# Patient Record
Sex: FEMALE | Race: WHITE
[De-identification: names, ages, dates, MRNs, and addresses within clinical notes are randomized per-mention and may not be internally consistent; named-entity substitution may affect disease eponyms.]

---

## 2017-03-06 ENCOUNTER — HOSPITAL ENCOUNTER (EMERGENCY)
Dept: HOSPITAL 74 - JERFT | Age: 37
Discharge: HOME | End: 2017-03-06
Payer: COMMERCIAL

## 2017-03-06 VITALS — BODY MASS INDEX: 29.9 KG/M2

## 2017-03-06 VITALS — HEART RATE: 88 BPM | SYSTOLIC BLOOD PRESSURE: 122 MMHG | DIASTOLIC BLOOD PRESSURE: 68 MMHG | TEMPERATURE: 98.6 F

## 2017-03-06 DIAGNOSIS — Z3A.26: ICD-10-CM

## 2017-03-06 DIAGNOSIS — J02.9: Primary | ICD-10-CM

## 2017-03-06 NOTE — PDOC
History of Present Illness





- General


Chief Complaint: Sore Throat


Stated Complaint: 26WKS PREGNANT, STREP THROAT


Time Seen by Provider: 03/06/17 11:45


History Source: Patient


Exam Limitations: No Limitations





- History of Present Illness


Initial Comments: 





03/06/17 12:19


36 yr female with sore throat this am. Pt states her children had strep last 

week. Pt is 7 months pregnant no medical history or allergies. 





Past History





- Past Medical History


Allergies/Adverse Reactions: 


 Allergies











Allergy/AdvReac Type Severity Reaction Status Date / Time


 


No Known Allergies Allergy   Verified 03/06/17 10:36











Home Medications: 


Ambulatory Orders





NK [No Known Home Medication]  03/06/17 








Asthma: No


Cancer: No


Cardiac Disorders: No


Diabetes: No


HTN: No


Seizures: No


Thyroid Disease: No





- Family Disease History


Comment:: 





03/06/17 12:20


none





- Psycho/Social/Smoking Cessation Hx


Anxiety: No


Suicidal Ideation: No


Smoking Status: No


Smoking History: Never smoked


Have you smoked in the past 12 months: No


Number of Cigarettes Smoked Daily: 0


Information on smoking cessation initiated: No


Hx Alcohol Use: No


Drug/Substance Use Hx: No


Substance Use Type: None


Hx Substance Use Treatment: No





**Review of Systems





- Review of Systems


Able to Perform ROS?: Yes


Is the patient limited English proficient: No


Constitutional: No: Symptoms Reported


HEENTM: Yes: See HPI


Respiratory: No: Symptoms reported


Cardiac (ROS): No: Symptoms Reported


ABD/GI: No: Symptoms Reported


: No: Symptoms Reported


Musculoskeletal: No: Symptoms Reported


Integumentary: No: Symptoms Reported


Neurological: No: Symptoms reported





*Physical Exam





- Vital Signs


 Last Vital Signs











Temp Pulse Resp BP Pulse Ox


 


 98.6 F   88   18   122/68   100 


 


 03/06/17 10:37  03/06/17 10:37  03/06/17 10:37  03/06/17 10:37  03/06/17 10:37














- Physical Exam


General Appearance: Yes: Nourished, Appropriately Dressed


HEENT: positive: EOMI, AUDREY, TMs Normal, Pharynx Normal


Neck: positive: Supple


Respiratory/Chest: positive: Lungs Clear, Normal Breath Sounds


Cardiovascular: positive: Regular Rhythm, Regular Rate


Gastrointestinal/Abdominal: positive: Normal Bowel Sounds, Soft


Musculoskeletal: positive: Normal Inspection


Extremity: positive: Normal Capillary Refill, Normal Inspection, Normal Range 

of Motion


Integumentary: positive: Normal Color, Dry, Warm


Neurologic: positive: Fully Oriented, Alert, Normal Mood/Affect, Normal Response

, Motor Strength 5/5





Medical Decision Making





- Medical Decision Making





03/06/17 12:20


cc: sore throat 


afebrile no diff swallowing 


will check for strep 








*DC/Admit/Observation/Transfer


Diagnosis at time of Disposition: 


Pharyngitis


Qualifiers:


 Pharyngitis/tonsillitis etiology: unspecified etiology Qualified Code(s): 

J02.9 - Acute pharyngitis, unspecified





- Discharge Dispostion


Disposition: HOME


Condition at time of disposition: Good





- Patient Instructions


Additional Instructions: 


negative for strep throat on the rapid swab


gargle with warm salt water 4-5 times a day


throat lozengers lemon drops 


drink pleanty of fluids


tylenol for pain as needed 





follow with ENT if any symptoms worsen or persist

## 2017-05-30 ENCOUNTER — HOSPITAL ENCOUNTER (INPATIENT)
Dept: HOSPITAL 74 - JDEL | Age: 37
LOS: 2 days | Discharge: HOME | DRG: 560 | End: 2017-06-01
Attending: OBSTETRICS & GYNECOLOGY | Admitting: OBSTETRICS & GYNECOLOGY
Payer: COMMERCIAL

## 2017-05-30 VITALS — BODY MASS INDEX: 31.5 KG/M2

## 2017-05-30 DIAGNOSIS — Z3A.38: ICD-10-CM

## 2017-05-30 DIAGNOSIS — E66.8: ICD-10-CM

## 2017-05-30 LAB
ANION GAP SERPL CALC-SCNC: 13 MMOL/L (ref 8–16)
APTT BLD: 26.6 SECONDS (ref 26.9–34.4)
ARTERIAL PATENCY WRIST A: (no result)
BASE EXCESS BLDA CALC-SCNC: -3.8 MEQ/L (ref -2–2)
BASOPHILS # BLD: 0.7 % (ref 0–2)
CALCIUM SERPL-MCNC: 9.4 MG/DL (ref 8.5–10.1)
CO2 SERPL-SCNC: 19 MMOL/L (ref 21–32)
COCKROFT - GAULT: 121.72
CREAT SERPL-MCNC: 0.7 MG/DL (ref 0.55–1.02)
DEPRECATED RDW RBC AUTO: 14.6 % (ref 11.6–15.6)
DEPRECATED RDW RBC AUTO: 14.8 % (ref 11.6–15.6)
EOSINOPHIL # BLD: 0.5 % (ref 0–4.5)
GLUCOSE SERPL-MCNC: 82 MG/DL (ref 74–106)
HCO3 BLDA-SCNC: 19.9 MEQ/L (ref 22–26)
HIV 1 & 2 AB: NEGATIVE
HIV 1 AGP24: NEGATIVE
INR BLD: 0.96 (ref 0.82–1.09)
LPM/O2%: (no result)
MCH RBC QN AUTO: 27.7 PG (ref 25.7–33.7)
MCH RBC QN AUTO: 28 PG (ref 25.7–33.7)
MCHC RBC AUTO-ENTMCNC: 32.9 G/DL (ref 32–36)
MCHC RBC AUTO-ENTMCNC: 33.3 G/DL (ref 32–36)
MCV RBC: 84.1 FL (ref 80–96)
MCV RBC: 84.2 FL (ref 80–96)
NEUTROPHILS # BLD: 55 % (ref 42.8–82.8)
NEUTROPHILS # BLD: 67 % (ref 42.8–82.8)
PLATELET # BLD AUTO: 113 K/MM3 (ref 134–434)
PLATELET # BLD EST: (no result) 10*3/UL
PLATELET # BLD EST: ADEQUATE 10*3/UL
PLATELET COMMENT2: (no result)
PLATELET COMMENT2: (no result)
PLATELET COMMENT3: (no result)
PLATELET COMMENT3: (no result)
PMV BLD: 11.3 FL (ref 7.5–11.1)
PO2 BLDA: 42.6 MMHG (ref 80–100)
PT PNL PPP: 10.6 SEC (ref 9.98–11.88)
PT. ON O2?: YES
SAO2 % BLDA: 82.6 % (ref 90–98.9)
TYPE OF O2: (no result)
WBC # BLD AUTO: 8.4 K/MM3 (ref 4–10)
WBC # BLD AUTO: 8.9 K/MM3 (ref 4–10)

## 2017-05-30 RX ADMIN — AMPICILLIN SODIUM SCH GM: 1 INJECTION, POWDER, FOR SOLUTION INTRAMUSCULAR; INTRAVENOUS at 10:00

## 2017-05-30 RX ADMIN — IBUPROFEN PRN MG: 600 TABLET, FILM COATED ORAL at 20:50

## 2017-05-30 RX ADMIN — AMPICILLIN SODIUM SCH GM: 1 INJECTION, POWDER, FOR SOLUTION INTRAMUSCULAR; INTRAVENOUS at 06:20

## 2017-05-30 RX ADMIN — ACETAMINOPHEN PRN MG: 325 TABLET ORAL at 17:47

## 2017-05-30 RX ADMIN — ACETAMINOPHEN PRN MG: 325 TABLET ORAL at 20:49

## 2017-05-30 RX ADMIN — AMPICILLIN SODIUM SCH GM: 1 INJECTION, POWDER, FOR SOLUTION INTRAMUSCULAR; INTRAVENOUS at 14:20

## 2017-05-30 NOTE — HP
Past Medical History





- Primary Care Physician


PCP:: Ashwin Cat





- Admission


Chief Complaint: 36 yo  @ 38.2wks with contructions, no VB, no LOF, +FM


History of Present Illness: 


1. GBS+ - for Ampicillin prophylaxis





History Source: Patient


Limitations to Obtaining History: No Limitations





- Past Medical History


...: 5


...Para: 4


...Term: 4


...: 0


...Spon : 0


...Induced : 0


...Multiple Gestation: 0


...LMP: 16


... Weeks Gestation by Dates: 38.2


...EDC by Dates: 17


...EDC by Sono: 17





- Past Surgical History


Past Surgical History: Yes: None


Hx Myomectomy: No


Hx Transabdominal Cerclage: No





- Smoking History


Smoking history: Never smoked


Have you smoked in the past 12 months: No


Aproximately how many cigarettes per day: 0





- Alcohol/Substance Use


Hx Alcohol Use: No


History of Substance Use: reports: None





- Social History


History of Recent Travel: No





Home Medications





- Allergies


Allergies/Adverse Reactions: 


 Allergies











Allergy/AdvReac Type Severity Reaction Status Date / Time


 


No Known Allergies Allergy   Verified 17 02:50














- Home Medications


Home Medications: 


Ambulatory Orders





[id number]/Iron Fumarate/FA/Dss [Prenatal 19 Tablet] 1 each PO DAILY 17 











Review of Systems





- Review of Systems


Constitutional: reports: No Symptoms


Eyes: reports: No Symptoms


HENT: reports: No Symptoms


Neck: reports: No Symptoms


Cardiovascular: reports: No Symptoms


Respiratory: reports: No Symptoms


Gastrointestinal: reports: No Symptoms


Genitourinary: reports: No Symptoms


Breasts: reports: No Symptoms Reported


Musculoskeletal: reports: No Symptoms


Integumentary: reports: No Symptoms


Neurological: reports: No Symptoms


Endocrine: reports: No Symptoms


Hematology/Lymphatic: reports: No Symptoms


Psychiatric: reports: No Symptoms





Physical Exam - Maternity


Vital Signs: 


 Vital Signs











Temperature  98.0 F   17 05:00


 


Pulse Rate  69   17 06:00


 


Respiratory Rate  18   17 06:00


 


Blood Pressure  117/86   17 06:00


 


O2 Sat by Pulse Oximetry (%)      











Constitutional: Yes: Well Nourished


Cardiovascular: Yes: WNL


Lungs: Clear to auscultation


Breast(s): Yes: WNL





- Abdominal Exam/OB


Fundal Height: 37


Number of Fetuses: Single


Fetal Presentation: Vertex


Contractions: Yes


Regularity: Regular


Intensity: Mild/Mod


Fetal Monitor Mode: External


Fetal Heart Rate Location: Midline


Category: I


Accelerations: Uniform


Decelerations: None





- Vaginal Exam/OB


Vaginal Bleediing: Bloody Show


Dilatation (cm): 5


Effacement (%): 50%


Amniotic Membrane Status: Intact


Fetal Presentation: Vertex/Position


Fetal Station: -3





- Physical Exam


Musculoskeletal: Yes: WNL


Extremities: Yes: WNL


Edema: No


Integumentary: Yes: WNL


Deep Tendon Reflex Grade: Normal +2


...Motor Strength: WNL


Psychiatric: Yes: WNL





- Labs


Lab Results: 


 CBC, BMP





 17 02:20 





 17 02:20 











Assessment/Plan


36 yo P4 @ 38.2 wks in labor 


Admit to L&D


Send Labs, IVF


Desires Epidural for pain control


MF status reasuring


Will AROM after epidural for augmentation

## 2017-05-30 NOTE — PN
Ante-Partal Exam





- Subjective


Subjective: 


Pt w/o complaints


Vital Signs: 


 Vital Signs











Temperature  98.1 F   05/30/17 10:00


 


Pulse Rate  56 L  05/30/17 10:30


 


Respiratory Rate  20   05/30/17 10:30


 


Blood Pressure  135/76   05/30/17 10:30


 


O2 Sat by Pulse Oximetry (%)  100   05/30/17 10:30











Bleeding: No


Headache: No


Visual changes: No


Right upper quadrant pain: No


Pain (scale 1-10): 0 (Epidural)





- Contractions


Contractions: Yes


Regularity: Regular


Intensity: Mild/Mod


Fetal Monitor Mode: External





- Exam during Labor


Fetal Heart Rate: 110


Variability: Moderate


Fetal Heart Rate Location: Midline


Category: I


Fetal Monitor Accelerations: Present


Fetal Monitor Decelerations: Variable (occasional)


Exam: Vaginal


Dilatation (cm): 6


Effacement (%): 70


Amniotic Membrane Status: Intact


Fetal Presentation: Vertex


Fetal Station: -4





- Intrapartum Hemorrhage Risk


Medium Risk Factors: None


High Risk Factors: None


Risk Score: 0


Risk Level: Low Risk





- Assessment/Plan


Assessment/Plan: 


30 yo P4 with spont active labor.


FHT with change in baseline but o/w Category I


No cervical dilation, c/w arrest of dilation


Fetal head is very high to AROM now. Contx's are not regular.


Plan to augment with pitocin.

## 2017-05-30 NOTE — PN
Delivery





- Delivery


Vaginal Delivery: No Problems, Spontaneous


Type of Anesthesia: Epidural


Episiotomy/Laceration: None


EBL (cc): 300





Delivery, Single Birth





- Stages of Labor


Date 1st Stage Initiatied: 17


Time 1st Stage Initiated: 02:00


Date 2nd Stage Initiated: 17


Time 2nd Stage Initiated: 15:30


Date of Delivery: 17


Time of Delivery: 15:47


Date Placenta Delivered: 17


Time Placenta Delivered: 16:00


Placenta: Yes: Spontaneous, Normal Configuration





- Condition of Infant


Pediatrician/Neonatologist Present: Yes


Name: Sarahi Gates


Infant Gender: Female


Birth Weight: 3.175 kg


Position: Left, OA


Total Hours ROM (Hrs/Mins): 2hr 7min





- Apgar


  ** 1 Minute


Apgar Total Score: 9





  ** 5 Minutes


Apgar Total Score: 9





-  Feeding Plan


Initial Plan: Elected not to breastfeed exclusively throughout hospitalization





Remarks





- Remarks


Remarks: 


Normal delivery. Mother and baby are well.

## 2017-05-31 LAB
BASOPHILS # BLD: 0.5 % (ref 0–2)
DEPRECATED RDW RBC AUTO: 15.3 % (ref 11.6–15.6)
EOSINOPHIL # BLD: 1.1 % (ref 0–4.5)
MCH RBC QN AUTO: 27.8 PG (ref 25.7–33.7)
MCHC RBC AUTO-ENTMCNC: 32.9 G/DL (ref 32–36)
MCV RBC: 84.5 FL (ref 80–96)
NEUTROPHILS # BLD: 72.6 % (ref 42.8–82.8)
PLATELET # BLD AUTO: 105 K/MM3 (ref 134–434)
PMV BLD: 11 FL (ref 7.5–11.1)
WBC # BLD AUTO: 11.8 K/MM3 (ref 4–10)

## 2017-05-31 RX ADMIN — IBUPROFEN PRN MG: 600 TABLET, FILM COATED ORAL at 10:03

## 2017-05-31 RX ADMIN — ACETAMINOPHEN PRN MG: 325 TABLET ORAL at 05:38

## 2017-05-31 RX ADMIN — Medication SCH TAB: at 10:05

## 2017-05-31 RX ADMIN — IBUPROFEN PRN MG: 600 TABLET, FILM COATED ORAL at 05:38

## 2017-05-31 RX ADMIN — ACETAMINOPHEN PRN MG: 325 TABLET ORAL at 21:30

## 2017-05-31 RX ADMIN — ACETAMINOPHEN PRN MG: 325 TABLET ORAL at 00:23

## 2017-05-31 RX ADMIN — IBUPROFEN PRN MG: 600 TABLET, FILM COATED ORAL at 21:29

## 2017-05-31 RX ADMIN — IBUPROFEN PRN MG: 600 TABLET, FILM COATED ORAL at 00:24

## 2017-05-31 RX ADMIN — ACETAMINOPHEN PRN MG: 325 TABLET ORAL at 10:02

## 2017-05-31 RX ADMIN — DOCUSATE SODIUM,SENNOSIDES PRN TABLET: 50; 8.6 TABLET, FILM COATED ORAL at 10:02

## 2017-06-01 VITALS — DIASTOLIC BLOOD PRESSURE: 76 MMHG | TEMPERATURE: 98.6 F | HEART RATE: 71 BPM | SYSTOLIC BLOOD PRESSURE: 125 MMHG

## 2017-06-01 RX ADMIN — Medication SCH TAB: at 10:21

## 2017-06-01 RX ADMIN — ACETAMINOPHEN PRN MG: 325 TABLET ORAL at 08:08

## 2017-06-01 RX ADMIN — DOCUSATE SODIUM,SENNOSIDES PRN TABLET: 50; 8.6 TABLET, FILM COATED ORAL at 08:09

## 2017-06-01 RX ADMIN — IBUPROFEN PRN MG: 600 TABLET, FILM COATED ORAL at 08:07

## 2017-06-01 NOTE — PN
Post Partum Progress Note





- Subjective


Subjective: 


Patient without acute complaints. 


Reports tolerating oral intake without nausea or vomiting. 


Ambulating without dizziness. 


Denies fevers or chills.


Pain well controlled with oral pain medication.


Bottlefeeding


Passing flatus.


Post Partum Day: 2


Type of Delivery: 


Vital Signs: 


 Vital Signs











Temperature  98.6 F   17 09:06


 


Pulse Rate  71   17 09:06


 


Respiratory Rate  17   17 09:06


 


Blood Pressure  125/76   17 09:06


 


O2 Sat by Pulse Oximetry (%)  100   17 15:30











Breast Exam: Yes: Engorged


Uterus: Yes: Fundus Firm, Fundus below umbilicus


Abdomen/GI: Yes: Abdomen soft, Passing flatus, Tolerating PO.  No: Tender


Lochia: Yes: Serosa


Lochia, amount: Small


Extremities: Yes: Calves non-tender


Activity: Ambulating





- Labs


Labs: 


 CBC











WBC  11.8 K/mm3 (4.0-10.0)  H D 17  08:00    


 


RBC  3.82 M/mm3 (3.60-5.2)   17  08:00    


 


Hgb  10.6 GM/dL (10.7-15.3)  L  17  08:00    


 


Hct  32.3 % (32.4-45.2)  L  17  08:00    


 


MCV  84.5 fl (80-96)   17  08:00    


 


MCHC  32.9 g/dl (32.0-36.0)   17  08:00    


 


RDW  15.3 % (11.6-15.6)   17  08:00    


 


Plt Count  105 K/MM3 (134-434)  L  17  08:00    


 


MPV  11.0 fl (7.5-11.1)   17  08:00    


 


Neutrophils %  72.6 % (42.8-82.8)   17  08:00    


 


Lymphocytes %  20.9 % (8-40)   17  08:00    


 


Monocytes %  4.9 % (3.8-10.2)   17  08:00    


 


Eosinophils %  1.1 % (0-4.5)  D 17  08:00    


 


Basophils %  0.5 % (0-2.0)   17  08:00    


 


Platelet Estimate  Decreased  (NORMAL)   17  12:25    


 


Platelet Comment  Mod large plts   17  12:25    


 


Platelet Comment  No clumping noted   17  12:25    














Assessment/Plan


38 yo PPD # 2 s/p , afebrile, vital signs stable, doing well 


1. Patient stable for discharge home today.


2. Patient encouraged to contact MD for:


- Severe pain not controlled by oral pain medication


- Fevers or chills


- Nausea or vomiting, intolerance of oral intake


3. Patient to follow up in office in 4-6 weeks for postpartum visit

## 2018-05-07 ENCOUNTER — HOSPITAL ENCOUNTER (OUTPATIENT)
Dept: HOSPITAL 74 - JASU-SURG | Age: 38
Discharge: HOME | End: 2018-05-07
Attending: OBSTETRICS & GYNECOLOGY
Payer: COMMERCIAL

## 2018-05-07 VITALS — BODY MASS INDEX: 31.1 KG/M2

## 2018-05-07 VITALS — TEMPERATURE: 97.9 F

## 2018-05-07 VITALS — HEART RATE: 67 BPM | DIASTOLIC BLOOD PRESSURE: 75 MMHG | SYSTOLIC BLOOD PRESSURE: 121 MMHG

## 2018-05-07 DIAGNOSIS — Z30.2: Primary | ICD-10-CM

## 2018-05-07 PROCEDURE — 0UB74ZZ EXCISION OF BILATERAL FALLOPIAN TUBES, PERCUTANEOUS ENDOSCOPIC APPROACH: ICD-10-PCS | Performed by: OBSTETRICS & GYNECOLOGY

## 2018-05-07 NOTE — HP
Past Medical History





- Primary Care Physician


PCP:: Ashwin Cat





- Admission


Chief Complaint: 39yo P5 admitted for surgical sterilization. The pt prefers 

laparoscopic bilateral salpingectomy.


History of Present Illness: 





Sterilization


History Source: Patient, Medical Record


Limitations to Obtaining History: No Limitations





- Past Medical History


CNS: No: Alzheimer's, CVA, Dementia, Migraine, Multiple Sclerosis, Peripheral 

Neuropathy, Parkinson's, Seizure, Syncope, TIA, Vertigo, Other


Cardiovascular: No: AFIB, Aneurysm, Aortic Insufficiency, Aortic Stenosis, CAD, 

CHF, Deep Vein Thrombosis, HTN, Hyperlipdemia, MI, Mitral Insufficiency, Mitral 

Stenosis, Murmur, Pulmonary Hypertension, Other


Pulmonary: No: Asthma, Bronchitis, Cancer, COPD, O2 Dependent, Pneumonia, 

Previously Intubated, Pulmonary Embolus, Pulmonary Fibrosis, Sleep Apnea, Other


Gastrointestinal: No: Ascites, Cancer, Constipation, Crohn's Disease, 

Diverticulitis, Diverticulosis, Esophageal Varices, Gastritis, GERD, GI Bleed, 

Hemorrhoids, Hiatal Hernia, Inflamatory Bowel Disease, Irritable Bowel Disease, 

Pancreatitis, Peptic Ulcer Disease, Ulcerative Colitis, Other


Hepatobiliary: No: Cirrhosis, Cholelithiasis, Cholecystitis, Choledocholithiasis

, Hepatitis A, Hepatitis B, Hepatitis C, Other


Renal/: No: Renal Failure, Renal Inusuff, BPH, Cancer, Hematuria, Hemodialysis

, Neurogenic Bladder, Renal Calculi, UTI, Other


Reproductive: No: Ectopic Pregnancy, Endometriosis, Fibroids, PID, Polycystic 

Ovary Syndrome, Postmenopausal, Other


...Para: 5


Heme/Onc: No: Anemia, B12 Deficiency, Bleeding Disorder, Cancer, Current 

Chemotherapy, Current Radiation Therapy, Hemochromatosis, Hypercoaguable State, 

Myeloproliferative Synd, Sickle Cell Disease, Sickle Cell Trait, 

Thrombocytopenia, Other


Infectious Disease: No: AIDS, C-Diff, Herpes Zoster, HIV, MRSA, STD's, 

Tuberculosis, VREF, Other


Psych: No: Addictions, Anxiety, Bipolar, Depression, Panic, Psychosis, 

Schizophrenia, Other


Musculoskeletal: No: Bursitis, Chronic low back pain, Hemiparesis, Hemiplegia, 

Osteoarthritis, Paraplegia, Other


Rheumatology: No: Fibromyalgia, Gout, Lupus, Rheumatoid Arthritis, Sarcoidosis, 

Vasculitis, Other


ENT: No: Allergic Rhinitis, Sinusitis, Other


Endocrine: No: Cottle's Disease, Cushing's Disease, Diabetes Insipidus, 

Diabetes Mellitus, Hyperparathyroidism, Hyperthyroidism, Hypothyroidism, 

Osteopenia, SIADH, Other





- Past Surgical History


Past Surgical History: Yes: None


Hx Myomectomy: No


Hx Transabdominal Cerclage: No





- Smoking History


Smoking history: Never smoked


Have you smoked in the past 12 months: No


Aproximately how many cigarettes per day: 0





- Alcohol/Substance Use


Hx Alcohol Use: No


History of Substance Use: reports: None





- Social History


Usual Living Arrangement: Yes: With Spouse, With Child


ADL: Independent


History of Recent Travel: No





Home Medications





- Allergies


Allergies/Adverse Reactions: 


 Allergies











Allergy/AdvReac Type Severity Reaction Status Date / Time


 


No Known Allergies Allergy   Verified 05/07/18 07:33














- Home Medications


Home Medications: 


Ambulatory Orders





NK [No Known Home Medication]  05/03/18 











Family Disease History





- Family Disease History


Family Disease History: Diabetes: Father, Mother





Review of Systems


Findings/Remarks: 





Well Appearing





- Review of Systems


Constitutional: reports: No Symptoms


Eyes: reports: No Symptoms


HENT: reports: No Symptoms


Neck: reports: No Symptoms


Cardiovascular: reports: No Symptoms


Respiratory: reports: No Symptoms


Gastrointestinal: reports: No Symptoms


Genitourinary: reports: No Symptoms


Breasts: reports: No Symptoms Reported


Musculoskeletal: reports: No Symptoms


Integumentary: reports: No Symptoms


Neurological: reports: No Symptoms


Endocrine: reports: No Symptoms


Hematology/Lymphatic: reports: No Symptoms


Psychiatric: reports: No Symptoms


Pain Intensity: 0





Physical Exam-GYN


Vital Signs: 


 Vital Signs











Temperature  98.2 F   05/07/18 07:23


 


Pulse Rate  68   05/07/18 07:23


 


Respiratory Rate  16   05/07/18 07:23


 


Blood Pressure  129/78   05/07/18 07:23


 


O2 Sat by Pulse Oximetry (%)  100   05/07/18 07:23











Constitutional: Yes: Well Nourished, No Distress, Calm


Eyes: Yes: WNL, Conjunctiva Clear, EOM Intact


HENT: Yes: WNL, Atraumatic, Normocephalic


Neck: Yes: WNL, Supple, Trachea Midline


Cardiovascular: Yes: WNL, Regular Rate and Rhythm


Respiratory: Yes: WNL, Regular, CTA Bilaterally


Gastrointestinal: Yes: WNL, Normal Bowel Sounds, Soft


...Rectal Exam: Yes: Deferred


Renal/: Yes: WNL


Pelvis: Yes: WNL


External Genitalia: Yes: Normal


Internal Exam Deferred: No


Vaginal Exam: Yes: Normal


Cervix: Yes: Normal


Uterus: Yes: Normal


Adnexa: Normal: Left, Right


Breast(s): Yes: WNL


Musculoskeletal: Yes: WNL


Extremities: Yes: WNL


Edema: No


Integumentary: Yes: WNL


Neurological: Yes: WNL, Alert, Oriented


...Motor Strength: WNL


Psychiatric: Yes: WNL, Alert, Oriented





Assessment/Plan





39yo P5 admitted for surgical sterilization. The pt prefers laparoscopic 

bilateral salpingectomy. We had discussed the risks, benefits, alternatives of 

surgery at length including but not limited to infection, bleeding, scarring, 

perforation, amenorrhea, infertility, hysterectomy, injury to surrounding/

underlying organs or structure, need for additional surgery to repair/treat any 

complications, etc. The patient verbalized understanding and requested to 

proceed with surgery. I emphasized that all surgeries have risks and no 

guarantees can be provided.

## 2018-05-08 NOTE — PATH
Surgical Pathology Report



Patient Name:  ENA CUELLAR

Accession #:  [id number]

Med. Rec. #:  [id number]                                                        

   /Age/Gender:  1980 (Age: 38) / F

Account:  [id number]                                                          

             Location: [facility]

Taken:  2018

Received:  2018

Reported:  2018

Physicians:  Ashwin Cat M.D.

  



Specimen(s) Received

 BILATERAL FALLOPIAN TUBES 





Clinical History

Voluntary sterilization







Final Diagnosis

BILATERAL FALLOPIAN TUBES, RESECTION:

COMPLETE CROSS SECTIONS OF BILATERAL FALLOPIAN TUBES IDENTIFIED.

BILATERAL FIMBRIAE WITH NO DIAGNOSTIC ABNORMALITIES.





***Electronically Signed***

Ginette Knott M.D.





Gross Description

Received in formalin labeled "bilateral fallopian tubes," are 2 undesignated,

fimbriated portions of fallopian tube measuring 4.0 and 6.0 cm in length. The

outer surfaces are tan-pink with fibrous adhesions. Sectioning reveals an

unremarkable lumen. Representative sections are submitted 4 cassettes as

follows: 1- shorter fallopian tube fimbria; 2-cross sections of shorter

fallopian tube; 3-longer fallopian tube fimbria; 4-cross sections of longer

fallopian tube.

/2018



saudi

## 2020-11-30 ENCOUNTER — HOSPITAL ENCOUNTER (OUTPATIENT)
Dept: HOSPITAL 74 - JASU-SURG | Age: 40
Discharge: HOME | End: 2020-11-30
Attending: OBSTETRICS & GYNECOLOGY
Payer: COMMERCIAL

## 2020-11-30 VITALS — SYSTOLIC BLOOD PRESSURE: 148 MMHG | TEMPERATURE: 96.8 F | DIASTOLIC BLOOD PRESSURE: 90 MMHG | HEART RATE: 63 BPM

## 2020-11-30 VITALS — BODY MASS INDEX: 29.5 KG/M2

## 2020-11-30 DIAGNOSIS — D25.0: Primary | ICD-10-CM

## 2020-11-30 DIAGNOSIS — N92.0: ICD-10-CM

## 2020-11-30 DIAGNOSIS — D64.9: ICD-10-CM

## 2020-11-30 DIAGNOSIS — Z80.49: ICD-10-CM

## 2020-11-30 LAB
APTT BLD: 29.7 SECONDS (ref 25.2–36.5)
INR BLD: 1.09 (ref 0.83–1.09)
PT PNL PPP: 13.1 SEC (ref 9.7–13)

## 2020-11-30 PROCEDURE — 0UDB7ZX EXTRACTION OF ENDOMETRIUM, VIA NATURAL OR ARTIFICIAL OPENING, DIAGNOSTIC: ICD-10-PCS | Performed by: OBSTETRICS & GYNECOLOGY

## 2020-11-30 PROCEDURE — 0UB98ZZ EXCISION OF UTERUS, VIA NATURAL OR ARTIFICIAL OPENING ENDOSCOPIC: ICD-10-PCS | Performed by: OBSTETRICS & GYNECOLOGY

## 2020-11-30 PROCEDURE — 0UJD8ZZ INSPECTION OF UTERUS AND CERVIX, VIA NATURAL OR ARTIFICIAL OPENING ENDOSCOPIC: ICD-10-PCS | Performed by: OBSTETRICS & GYNECOLOGY

## 2023-02-27 ENCOUNTER — OFFICE (OUTPATIENT)
Dept: URBAN - METROPOLITAN AREA CLINIC 30 | Facility: CLINIC | Age: 43
Setting detail: OPHTHALMOLOGY
End: 2023-02-27
Payer: MEDICAID

## 2023-02-27 DIAGNOSIS — H16.223: ICD-10-CM

## 2023-02-27 DIAGNOSIS — H52.4: ICD-10-CM

## 2023-02-27 PROCEDURE — 92015 DETERMINE REFRACTIVE STATE: CPT | Performed by: OPHTHALMOLOGY

## 2023-02-27 PROCEDURE — 92004 COMPRE OPH EXAM NEW PT 1/>: CPT | Performed by: OPHTHALMOLOGY

## 2023-02-27 ASSESSMENT — REFRACTION_CURRENTRX
OS_AXIS: 177
OS_OVR_VA: 20/
OD_ADD: +0.75
OD_CYLINDER: +0.50
OS_CYLINDER: +0.25
OD_AXIS: 168
OD_OVR_VA: 20/
OD_SPHERE: -0.50
OS_ADD: +0.75
OS_SPHERE: -0.50

## 2023-02-27 ASSESSMENT — REFRACTION_MANIFEST
OS_VA1: 20/20-1
OD_VA1: 20/20
OS_ADD: +1.00
OD_CYLINDER: +0.50
OS_AXIS: 8
OD_SPHERE: PLANO
OS_CYLINDER: +0.75
OD_ADD: +1.00
OS_SPHERE: PLANO
OS_SPHERE: -0.25
OD_AXIS: 178
OD_SPHERE: -0.25

## 2023-02-27 ASSESSMENT — REFRACTION_AUTOREFRACTION
OD_CYLINDER: +0.50
OS_CYLINDER: +0.75
OS_SPHERE: -0.25
OS_AXIS: 8
OD_SPHERE: -0.25
OD_AXIS: 178

## 2023-02-27 ASSESSMENT — VISUAL ACUITY
OS_BCVA: 20/20-1
OD_BCVA: 20/25-1

## 2023-02-27 ASSESSMENT — CONFRONTATIONAL VISUAL FIELD TEST (CVF)
OS_FINDINGS: FULL
OD_FINDINGS: FULL

## 2023-02-27 ASSESSMENT — SPHEQUIV_DERIVED
OD_SPHEQUIV: 0
OS_SPHEQUIV: 0.125
OS_SPHEQUIV: 0.125
OD_SPHEQUIV: 0

## 2024-03-21 NOTE — OP
DATE OF OPERATION:  05/07/2018

 

PREOPERATIVE DIAGNOSIS:  Sterilization.

 

POSTOPERATIVE DIAGNOSIS:  Sterilization.

 

PROCEDURE:  Laparoscopic salpingectomy bilateral.

 

SURGEON:  Joey Cat MD 

 

ASSISTANT:  Alondra Mcqueen MD 

 

ANESTHESIA:  General endotracheal.

 

ANESTHESIOLOGIST:  Jeovany Desai MD 

 

IV FLUIDS:  800 mL.

 

URINE OUTPUT:  300 mL of clear urine at the end of the procedure.

 

ESTIMATED BLOOD LOSS:  5 mL.

 

PATHOLOGY:  Fallopian tubes bilateral.  

 

COMPLICATIONS:  None.

 

DESCRIPTION OF PROCEDURE:  The patient was met preoperatively.  Risks, benefits, and

alternatives of surgery were discussed in detail.  All questions were answered.  The

patient was then explained the surgery, risks, benefits, and alternatives.  The

patient verbalized her understanding and agreed to procedure with the surgery.  She

was brought to the OR room with the IV running.  The patient was placed on the OR

table in a supine position.  The general endotracheal anesthesia was achieved without

difficulty.  The patient was then placed in a dorsal lithotomy position using

adjustable Ebenezer stirrups.  She was examined under anesthesia.  The examination

showed a small, anteverted uterus with no pelvic or adnexal masses.  The patient was

then prepped and draped in the usual sterile fashion.  A time-out procedure was

conducted as per standard protocol.  A Cazares catheter was then inserted and left to

drain to gravity.  A uterine manipulation was placed inside the uterus without

complications.  The surgeons then regloved and proceeded with the laparoscopy.  A

5-mm umbilical incision was made with a knife.  An Optiview trocar was placed through

the umbilical incision under direct visualization.  A pneumoperitoneum was then

produced with the CO2 gas and intra-abdominal pressure limited to 15 mmHg.  Survey of

the abdominal and peritoneal cavity showed a normal uterus, fallopian tubes, and

ovaries as well as a normal liver, gallbladder, and bowel.  A 5-mm incision was then

made in the left lower quadrant, and a 5-mm trocar was placed under direct

visualization.  Another 5-mm incision was made in the right lower quadrant, and a

5-mm trocar was inserted through that incision under direct visualization.  A

LigaSure device was then used to excise both fallopian tubes with good hemostasis. 

Both fallopian tubes were removed and sent to Pathology for evaluation.  The

operative site was examined, and excellent hemostasis was noted.  At that point, all

of the instruments were removed from the patient's abdomen.  The pneumoperitoneum was

reduced.  The incisions were closed using a 4-0 Biosyn suture.  Sponge, lap, and

instrument counts were correct.  The patient was transferred to the recovery room

awake and in stable condition.

 

 

 

JOEY CAT M.D.

 

FRANCISCO [id number]

DD: 05/07/2018 10:42

DT: 05/07/2018 11:40

Job #:  [id number]
Operative Note





- Note:


Operative Date: 05/07/18


Pre-Operative Diagnosis: Sterilization


Operation: Laparoscopic salpingectomy


Findings: 





Normal uterus, tubes, bowel, liver, GB


Post-Operative Diagnosis: Same as Pre-op


Surgeon: Ashwin Cat


Assistant: Alondra Mcqueen


Anesthesiologist/CRNA: Jeovany Desai


Anesthesia: Spinal


Specimens Removed: Bilateral Fallopian tubes


Estimated Blood Loss (mls): 5


Drains, Volume Out (mls): 300


Blood Volume Replaced (mls): 0


Fluid Volume Replaced (mls): 800


Operative Report Dictated: Yes
Normal